# Patient Record
Sex: FEMALE | Race: WHITE | URBAN - METROPOLITAN AREA
[De-identification: names, ages, dates, MRNs, and addresses within clinical notes are randomized per-mention and may not be internally consistent; named-entity substitution may affect disease eponyms.]

---

## 2017-10-04 ENCOUNTER — APPOINTMENT (OUTPATIENT)
Age: 72
Setting detail: DERMATOLOGY
End: 2017-10-06

## 2017-10-04 PROBLEM — C44.91 BASAL CELL CARCINOMA OF SKIN, UNSPECIFIED: Status: ACTIVE | Noted: 2017-10-04

## 2017-10-04 PROCEDURE — OTHER MOHS SURGERY PHONE CONSULTATION: OTHER

## 2017-10-04 NOTE — HPI: MOHS SURGERY CONSULTATION
Additional History: The patient plans to be accompanied by her , Brenton, and will perform her own wound care.

## 2017-10-04 NOTE — PROCEDURE: MOHS SURGERY PHONE CONSULTATION
Patient Reported Location: to the right of her right eye
Has The Patient Ever Been Seen In Our Practice For Mohs Surgery Before?: No
Time Of Mohs Surgery: 0840
Office Location Of Mohs Surgery: [facility]
Referring Provider: Annette Arevalo MD
Detail Level: Detailed
Date Of Mohs Surgery: 10/05/2017
Has The Pathology Report Been Received?: Yes

## 2017-10-05 ENCOUNTER — APPOINTMENT (OUTPATIENT)
Age: 72
Setting detail: DERMATOLOGY
End: 2017-10-06

## 2017-10-05 PROBLEM — C44.91 BASAL CELL CARCINOMA OF SKIN, UNSPECIFIED: Status: ACTIVE | Noted: 2017-10-05

## 2017-10-05 PROBLEM — C44.319 BASAL CELL CARCINOMA OF SKIN OF OTHER PARTS OF FACE: Status: ACTIVE | Noted: 2017-10-05

## 2017-10-05 PROCEDURE — 99201: CPT | Mod: 25

## 2017-10-05 PROCEDURE — OTHER MOHS SURGERY: OTHER

## 2017-10-05 PROCEDURE — OTHER MIPS QUALITY: OTHER

## 2017-10-05 PROCEDURE — 13151 CMPLX RPR E/N/E/L 1.1-2.5 CM: CPT

## 2017-10-05 PROCEDURE — 17311 MOHS 1 STAGE H/N/HF/G: CPT

## 2017-10-05 PROCEDURE — OTHER CONSULTATION FOR MOHS SURGERY: OTHER

## 2017-10-05 PROCEDURE — OTHER PRESCRIPTION: OTHER

## 2017-10-05 RX ORDER — GENTAMICIN SULFATE 0.3 %
OINTMENT (GRAM) OPHTHALMIC (EYE) QD
Qty: 3.5 | Refills: 0 | Status: ERX | COMMUNITY
Start: 2017-10-05

## 2017-10-05 NOTE — PROCEDURE: MOHS SURGERY
Body Location Override (Optional - Billing Will Still Be Based On Selected Body Map Location If Applicable): Right Lateral Canthus

## 2017-10-05 NOTE — PROCEDURE: MOHS SURGERY
Post-Care Instructions: The patient was provided with detailed verbal and written instructions for daily wound care, including use of H2O2 cleansing, followed by application of plain Vaseline and a bandage.  These instructions including Dr. Abraham's contact information should there be any questions or concerns.  The patient is not to engage in any heavy lifting, exercise, or swimming for the next week.  Should the patient develop any fevers, chills, bleeding, or pain not controlled by OTC analgesics, s/he should contact the office immediately.

## 2017-10-05 NOTE — PROCEDURE: CONSULTATION FOR MOHS SURGERY
Location Indication Override (Is Already Calculated Based On Selected Body Location): Area H
Incorporate Mauc In Note: Yes
Name Of The Referring Provider For Procedure: Annette Arevalo MD
Detail Level: Detailed
Size Of Lesion: 0.6
Body Location Override (Optional - Billing Will Still Be Based On Selected Body Map Location If Applicable): Right Lateral Canthus
X Size Of Lesion In Cm (Optional): 0.4

## 2017-10-12 ENCOUNTER — APPOINTMENT (OUTPATIENT)
Age: 72
Setting detail: DERMATOLOGY
End: 2017-11-03

## 2017-10-12 DIAGNOSIS — Z48.02 ENCOUNTER FOR REMOVAL OF SUTURES: ICD-10-CM

## 2017-10-12 DIAGNOSIS — Z85.828 PERSONAL HISTORY OF OTHER MALIGNANT NEOPLASM OF SKIN: ICD-10-CM

## 2017-10-12 PROCEDURE — OTHER SUTURE REMOVAL (GLOBAL PERIOD): OTHER

## 2017-10-12 PROCEDURE — OTHER COUNSELING: OTHER

## 2017-10-12 PROCEDURE — OTHER MIPS QUALITY: OTHER

## 2017-10-12 PROCEDURE — OTHER RETURN TO REFERRING PROVIDER: OTHER

## 2017-10-12 ASSESSMENT — LOCATION ZONE DERM: LOCATION ZONE: FACE

## 2017-10-12 ASSESSMENT — LOCATION SIMPLE DESCRIPTION DERM
LOCATION SIMPLE: RIGHT TEMPLE
LOCATION SIMPLE: RIGHT ZYGOMA

## 2017-10-12 ASSESSMENT — LOCATION DETAILED DESCRIPTION DERM
LOCATION DETAILED: RIGHT MEDIAL TEMPLE
LOCATION DETAILED: RIGHT MEDIAL ZYGOMA

## 2017-10-12 NOTE — PROCEDURE: MIPS QUALITY
Quality 143: Oncology: Medical And Radiation- Pain Intensity Quantified: Pain severity quantified, no pain present
Detail Level: Detailed
Quality 130: Documentation Of Current Medications In The Medical Record: Current Medications Documented
Quality 226: Preventive Care And Screening: Tobacco Use: Screening And Cessation Intervention: Patient screened for tobacco and is an ex-smoker
Quality 431: Preventive Care And Screening: Unhealthy Alcohol Use - Screening: Patient screened for unhealthy alcohol use using a single question and scores less than 2 times per year
Quality 110: Preventive Care And Screening: Influenza Immunization: Influenza Immunization previously received during influenza season

## 2017-10-12 NOTE — PROCEDURE: SUTURE REMOVAL (GLOBAL PERIOD)
Add 41596 Cpt? (Important Note: In 2017 The Use Of 89805 Is Being Tracked By Cms To Determine Future Global Period Reimbursement For Global Periods): no
Body Location Override (Optional - Billing Will Still Be Based On Selected Body Map Location If Applicable): right lateral canthus
Detail Level: Detailed

## 2020-04-03 ENCOUNTER — VIRTUAL VISIT (OUTPATIENT)
Dept: PHYSICAL THERAPY | Facility: CLINIC | Age: 75
End: 2020-04-03
Payer: MEDICARE

## 2020-04-03 DIAGNOSIS — Z96.649 S/P TOTAL HIP ARTHROPLASTY: ICD-10-CM

## 2020-04-03 DIAGNOSIS — S72.002A HIP FRACTURE, LEFT, CLOSED, INITIAL ENCOUNTER (H): Primary | ICD-10-CM

## 2020-04-03 PROCEDURE — 97161 PT EVAL LOW COMPLEX 20 MIN: CPT | Mod: GP | Performed by: PHYSICAL THERAPIST

## 2020-04-03 PROCEDURE — 97110 THERAPEUTIC EXERCISES: CPT | Mod: GP | Performed by: PHYSICAL THERAPIST

## 2020-04-03 PROCEDURE — 97112 NEUROMUSCULAR REEDUCATION: CPT | Mod: GP | Performed by: PHYSICAL THERAPIST

## 2020-04-03 NOTE — LETTER
"DEPARTMENT OF HEALTH AND HUMAN SERVICES  CENTERS FOR MEDICARE & MEDICAID SERVICES    PLAN/UPDATED PLAN OF PROGRESS FOR OUTPATIENT REHABILITATION    PATIENTS NAME:  Ирина Maria   : 1945  PROVIDER NUMBER:    [id number]  HICN:  [id number]  PROVIDER NAME: [facility] PHYSICAL THERAPY  MEDICAL RECORD NUMBER: [id number]   START OF CARE DATE:  SOC Date: 20   TYPE:  PT  PRIMARY/TREATMENT DIAGNOSIS: (Pertinent Medical Diagnosis)  Hip fracture, left, closed, initial encounter (H)  S/P total hip arthroplasty    VISITS FROM START OF CARE:    1    Physical Therapy Virtual Initial Visit  The patient has been notified of following:   \"This virtual visit will be conducted between you and your provider. We have found that certain health care needs can be provided without the need for physical presence.  This service lets us provide the care you need with a virtual visit.\"  Due to external, as well as internal [facility] management of the COVID-19 Virus, Ирина Maria was not seen in our clinic.  As a substitution, we implemented a virtual visit to manage this patient's condition utilizing the [employer] virtual visit platform via the patient s existing code.  The provider, Coral Alves, reviewed the patient's chart, PTRx prescription, and spoke with the patient to determine the following telemedicine visit is appropriate and effective for the patient's care.  The following type of visit was completed:   Video Visit:  The [employer] platform uses a synchronous HIPAA compliant video stream for this patient encounter.       Subjective:  The history is provided by the patient. No  was used.   Patient Health History  Ирина Maria being seen for s/p left hip MIYA revision  after fracture and then dislocation.   Problem began: 3/10/2020.   Problem occurred: 3/10 tripped and fractured femoral neck; MIYA 3/11; dislocation and revision 3/12   Pain is reported as 2/10 on pain " scale.  General health as reported by patient is good.  Pertinent medical history includes: history of fractures.   Red flags:  None as reported by patient.  Medical allergies: none.   Surgeries include:  Orthopedic surgery.    Current medications:  Pain medication.    Current occupation is retired, lives indep with spouse.            Therapist Generated HPI Evaluation  Problem details: Surgeon follow-up 3/30/2020 x-ray showed good healing and hardware placement.  Patient is weightbearing as tolerated.  Instructed to ambulate with walker with gradual weaning to cane.  She is to maintain posterior hip precautions for at least 3 PATIENTS NAME:  Ирина Maria   : 1945    months.  Patient is currently doing approximately 35 minutes of exercises daily from exercises she was given originally in the hospital.  She is using her walker outside for 5 minutes at a time, using a cane indoors, and going up stairs with handrail one at a time.  Review of MD follow-up with reports of good incisional healing, minimal swelling, 0 to 90 degrees of hip flexion range of motion, 5 out of 5 distal lower extremity strength, and no calf tenderness..         Type of problem:  Left hip.  Patient reports pain:  Joint.  Pain is described as aching and is intermittent.  Pain radiates to:  No radiation. Pain is the same all the time.  Since onset symptoms are gradually improving.  Special tests included:  X-ray.  Previous treatment includes surgery. There was significant improvement following previous treatment.  Objective:  System  Physical Exam  General   ROS   Patient observed through video to ambulate with single-point cane with decreased weightbearing through left lower extremity and increased truncal shift.  Observation of balance left lower extremity less than 5 seconds, right lower extremity 15 seconds with increased sway.  Stairs were performed safely but with use of handrail and cane with patient performing 1 step at a  time.  PTRx Content from today's visit:  Exercise Name: Ankle Active Range of Motion Dorsiflexion and Plantarflexion, Notes: WHEN LEG IS ELEVATED  Exercise Name: Toe Raises, Sets: 1 - Reps: 12 - Sessions: 2  Exercise Name: Sitting Hip Adduction Squeeze, Sets: 1 - Reps: 12 - Sessions: 1, Notes: FIRST PUT HANDS OUTSIDE KNEES AND PUSH OUT BUT DO LET KNEES MOVE x10 THEN DO 10 SQUEEZES IN  Exercise Name: Hip AROM Standing Abduction, Sets: 1 - Reps: 12 - Sessions: 2, Notes: DO ON BOTH SIDES  Exercise Name: Hip AROM Standing Extension, Sets: 1 - Reps: 12 - Sessions: 2, Notes: DO ON BOTH SIDES  Exercise Name: Knee Bends, Sets: 1 - Reps: 12 - Sessions: 2, Notes: AT SINK  Exercise Name: Forward Step, Sets: 1 - Reps: 10 - Sessions: 2, Notes: USE A 2-3 INCH HEIGHT BOOK AT BOTTOM STEP TO PRACTICE  Exercise Name: Stepdown Forward, Sets: 1 - Reps: 10 each leg - Sessions: 2  Exercise Name: Balance Single Leg Stance Supported and Unsupported, Sets: 1 - Reps: PRACTICE 1 MINUTE ON EACH SIDE - Sessions: 2, Notes: Daily.  To make this more challenging, you can stand on a pillow, cross your arms, or close your eyes.  ALWAYS HOLD ON TO STEADY SURFACE WITH ONE HAND.  Exercise Name: Tandem Walking and Balance, Sets: 1 - Reps: 1 min each side - Sessions: 2, Notes: PRACTICE JUST STANDING SUPPORTED WITH ONE FOOT IN FRONT- NOT WALKING  ALWAYS HOLD ON TO A STEADY SURFACE WITH ONE HAND    Assessment/Plan:   Patient is a 75 year old female with left side hip complaints.    PATIENTS NAME:  Ирина Maria   : 1945    Patient has the following significant findings with corresponding treatment plan.                Diagnosis 1:  S/p Left hip MIYA  Pain -  self management, education and home program  Decreased ROM/flexibility - manual therapy and therapeutic exercise  Decreased strength - therapeutic exercise  Impaired balance - neuro re-education and gait training  Impaired gait - gait training  Impaired muscle performance - neuro  re-education  Decreased function - home program    Therapy Evaluation Codes:   1) History comprised of:   Personal factors that impact the plan of care:      None.    Comorbidity factors that impact the plan of care are:      None.     Medications impacting care: None.  2) Examination of Body Systems comprised of:   Body structures and functions that impact the plan of care:      Hip.   Activity limitations that impact the plan of care are:      Walking.  3) Clinical presentation characteristics are:   Stable/Uncomplicated.  4) Decision-Making    Low complexity using standardized patient assessment instrument and/or measureable assessment of functional outcome.  Cumulative Therapy Evaluation is: Low complexity.    Previous and current functional limitations:  (See Goal Flow Sheet for this information)    Short term and Long term goals: (See Goal Flow Sheet for this information)     Communication ability:  Patient appears to be able to clearly communicate and understand verbal and written communication and follow directions correctly.  Treatment Explanation - The following has been discussed with the patient:   RX ordered/plan of care  Anticipated outcomes  Possible risks and side effects  This patient would benefit from PT intervention to resume normal activities.   Rehab potential is good.    Frequency:  1 X week, once daily every 2 weeks  Duration:  12 weeks  Discharge Plan:  Achieve all LTG.  Independent in home treatment program.  Reach maximal therapeutic benefit.    Virtual visit contact time  Time of service began: 11:30 AM  Time of service ended: 12:15 PM  Total Time for set up, visit, and documentation: 45 minutes  Payor: MEDICARE / Plan: MEDICARE / Product Type: Medicare /   PATIENTS NAME:  Ирина Maria   : 1945    Procedure Code/s   Therapeutic Exercise (86228): 15 minutes  Neuromuscular Re-education (73962): 15 minutes  Evaluation: 15 minutes    I have reviewed the note as documented above.   "This accurately captures the substance of my conversation with the patient.  Provider location: [place] ([facility]/State)  Patient location: home  ___________________________________________________    Any subjective info about the quality of the visit, ease of use: dna  Patient's opinion about reasonable cost for this visit dna     Caregiver Signature/Credentials _____________________________ Date ________       Treating Provider: Coral Alves, PT   I have reviewed and certified the need for these services and plan of treatment while under my care.        PHYSICIAN'S SIGNATURE:   _________________________________________  Date___________   YAO Vega CNP    Certification period:  Beginning of Cert date period: 04/03/20 to  End of Cert period date: 07/01/20     Functional Level Progress Report: Please see attached \"Goal Flow sheet for Functional level.\"    ____X____ Continue Services or       ________ DC Services                Service dates: From  SOC Date: 04/03/20 date to present                         "

## 2020-04-03 NOTE — PROGRESS NOTES
"Physical Therapy Virtual Initial Visit      The patient has been notified of following:     \"This virtual visit will be conducted between you and your provider. We have found that certain health care needs can be provided without the need for physical presence.  This service lets us provide the care you need with a virtual visit.\"    Due to external, as well as internal [facility] management of the COVID-19 Virus, Ирина Maria was not seen in our clinic.  As a substitution, we implemented a virtual visit to manage this patient's condition utilizing the [employer] virtual visit platform via the patient s existing code.  The provider, Coral Alves, reviewed the patient's chart, PTRx prescription, and spoke with the patient to determine the following telemedicine visit is appropriate and effective for the patient's care.    The following type of visit was completed:   Video Visit:  The [employer] platform uses a synchronous HIPAA compliant video stream for this patient encounter.         Subjective:  The history is provided by the patient. No  was used.   Patient Health History  Ирина Maria being seen for s/p left hip MIYA revision  after fracture and then dislocation.     Problem began: 3/10/2020.   Problem occurred: 3/10 tripped and fractured femoral neck; MIYA 3/11; dislocation and revision 3/12   Pain is reported as 2/10 on pain scale.  General health as reported by patient is good.  Pertinent medical history includes: history of fractures.   Red flags:  None as reported by patient.  Medical allergies: none.   Surgeries include:  Orthopedic surgery.    Current medications:  Pain medication.    Current occupation is retired, lives indep with spouse.                     Therapist Generated HPI Evaluation  Problem details: Surgeon follow-up 3/30/2020 x-ray showed good healing and hardware placement.  Patient is weightbearing as tolerated.  Instructed to ambulate with walker with gradual weaning to " cane.  She is to maintain posterior hip precautions for at least 3 months.    Patient is currently doing approximately 35 minutes of exercises daily from exercises she was given originally in the hospital.  She is using her walker outside for 5 minutes at a time, using a cane indoors, and going up stairs with handrail one at a time.    Review of MD follow-up with reports of good incisional healing, minimal swelling, 0 to 90 degrees of hip flexion range of motion, 5 out of 5 distal lower extremity strength, and no calf tenderness..         Type of problem:  Left hip.          Patient reports pain:  Joint.  Pain is described as aching and is intermittent.  Pain radiates to:  No radiation. Pain is the same all the time.  Since onset symptoms are gradually improving.     Special tests included:  X-ray.  Previous treatment includes surgery. There was significant improvement following previous treatment.                    Objective:    System  Physical Exam  General   ROS     Patient observed through video to ambulate with single-point cane with decreased weightbearing through left lower extremity and increased truncal shift.  Observation of balance left lower extremity less than 5 seconds, right lower extremity 15 seconds with increased sway.  Stairs were performed safely but with use of handrail and cane with patient performing 1 step at a time.    PTRx Content from today's visit:  Exercise Name: Ankle Active Range of Motion Dorsiflexion and Plantarflexion, Notes: WHEN LEG IS ELEVATED  Exercise Name: Toe Raises, Sets: 1 - Reps: 12 - Sessions: 2  Exercise Name: Sitting Hip Adduction Squeeze, Sets: 1 - Reps: 12 - Sessions: 1, Notes: FIRST PUT HANDS OUTSIDE KNEES AND PUSH OUT BUT DO LET KNEES MOVE x10 THEN DO 10 SQUEEZES IN  Exercise Name: Hip AROM Standing Abduction, Sets: 1 - Reps: 12 - Sessions: 2, Notes: DO ON BOTH SIDES  Exercise Name: Hip AROM Standing Extension, Sets: 1 - Reps: 12 - Sessions: 2, Notes: DO ON BOTH  SIDES  Exercise Name: Knee Bends, Sets: 1 - Reps: 12 - Sessions: 2, Notes: AT SINK  Exercise Name: Forward Step, Sets: 1 - Reps: 10 - Sessions: 2, Notes: USE A 2-3 INCH HEIGHT BOOK AT BOTTOM STEP TO PRACTICE  Exercise Name: Stepdown Forward, Sets: 1 - Reps: 10 each leg - Sessions: 2  Exercise Name: Balance Single Leg Stance Supported and Unsupported, Sets: 1 - Reps: PRACTICE 1 MINUTE ON EACH SIDE - Sessions: 2, Notes: Daily.  To make this more challenging, you can stand on a pillow, cross your arms, or close your eyes.  ALWAYS HOLD ON TO STEADY SURFACE WITH ONE HAND.  Exercise Name: Tandem Walking and Balance, Sets: 1 - Reps: 1 min each side - Sessions: 2, Notes: PRACTICE JUST STANDING SUPPORTED WITH ONE FOOT IN FRONT- NOT WALKING  ALWAYS HOLD ON TO A STEADY SURFACE WITH ONE HAND    Assessment/Plan:     Patient is a 75 year old female with left side hip complaints.    Patient has the following significant findings with corresponding treatment plan.                Diagnosis 1:  S/p Left hip MIYA  Pain -  self management, education and home program  Decreased ROM/flexibility - manual therapy and therapeutic exercise  Decreased strength - therapeutic exercise  Impaired balance - neuro re-education and gait training  Impaired gait - gait training  Impaired muscle performance - neuro re-education  Decreased function - home program    Therapy Evaluation Codes:   1) History comprised of:   Personal factors that impact the plan of care:      None.    Comorbidity factors that impact the plan of care are:      None.     Medications impacting care: None.  2) Examination of Body Systems comprised of:   Body structures and functions that impact the plan of care:      Hip.   Activity limitations that impact the plan of care are:      Walking.  3) Clinical presentation characteristics are:   Stable/Uncomplicated.  4) Decision-Making    Low complexity using standardized patient assessment instrument and/or measureable assessment of  functional outcome.  Cumulative Therapy Evaluation is: Low complexity.    Previous and current functional limitations:  (See Goal Flow Sheet for this information)    Short term and Long term goals: (See Goal Flow Sheet for this information)     Communication ability:  Patient appears to be able to clearly communicate and understand verbal and written communication and follow directions correctly.  Treatment Explanation - The following has been discussed with the patient:   RX ordered/plan of care  Anticipated outcomes  Possible risks and side effects  This patient would benefit from PT intervention to resume normal activities.   Rehab potential is good.    Frequency:  1 X week, once daily every 2 weeks  Duration:  12 weeks  Discharge Plan:  Achieve all LTG.  Independent in home treatment program.  Reach maximal therapeutic benefit.    Please refer to the daily flowsheet for treatment today, total treatment time and time spent performing 1:1 timed codes.       Virtual visit contact time    Time of service began: 11:30 AM  Time of service ended: 12:15 PM  Total Time for set up, visit, and documentation: 45 minutes    Payor: MEDICARE / Plan: MEDICARE / Product Type: Medicare /     Procedure Code/s   Therapeutic Exercise (87813): 15 minutes  Neuromuscular Re-education (85717): 15 minutes  Evaluation: 15 minutes    I have reviewed the note as documented above.  This accurately captures the substance of my conversation with the patient.  Provider location: [place] ([facility]/State)  Patient location: home    ___________________________________________________    Any subjective info about the quality of the visit, ease of use: dna  Patient's opinion about reasonable cost for this visit dna

## 2020-04-17 ENCOUNTER — VIRTUAL VISIT (OUTPATIENT)
Dept: PHYSICAL THERAPY | Facility: CLINIC | Age: 75
End: 2020-04-17
Payer: MEDICARE

## 2020-04-17 DIAGNOSIS — S72.002A HIP FRACTURE, LEFT, CLOSED, INITIAL ENCOUNTER (H): Primary | ICD-10-CM

## 2020-04-17 DIAGNOSIS — Z96.649 S/P TOTAL HIP ARTHROPLASTY: ICD-10-CM

## 2020-04-17 PROCEDURE — 97530 THERAPEUTIC ACTIVITIES: CPT | Mod: 95 | Performed by: PHYSICAL THERAPIST

## 2020-04-17 PROCEDURE — 97110 THERAPEUTIC EXERCISES: CPT | Mod: 95 | Performed by: PHYSICAL THERAPIST

## 2020-04-17 NOTE — PROGRESS NOTES
"Physical Therapy Virtual Follow Up Visit      The patient has been notified of following:     \"This virtual visit will be conducted between you and your provider. We have found that certain health care needs can be provided without the need for physical presence.  This service lets us provide the care you need with a virtual visit.\"    Due to external, as well as internal [facility] management of the COVID-19 Virus, Ирина Maria was not seen in our clinic.  As a substitution, we implemented a virtual visit to manage this patient's condition utilizing the P[employer]x virtual visit platform via the patient s existing code.  The provider, Coral Alves, reviewed the patient's chart, PTRx prescription, and spoke with the patient to determine the following telemedicine visit is appropriate and effective for the patient's care.    The following type of visit was completed:   Video Visit:  The PTRx platform uses a synchronous HIPAA compliant video stream for this patient encounter.        S: Ирина Maria is a 75 year old female. Connected virtually on the PTRx platform to discuss their condition/progress. They noted improvements in doing stairs without assistance; 1/2 mile walking with cane with some soreness last night; cutting back on tyelonol..  They noted ongoing pain or limitations with increase in anterior hip pain that improves with rest..     Current pain level: 2/10      O: Patient demonstrated balance:SLS 15-20 sec; reciprocal stairs   System  Physical Exam  General   ROS  PTRx Content from today's visit: Addressed questions: soreness w walkling  Exercise Name: All 4s Arm Lift, Sets: 1 - Reps: 12 each - Sessions: 1  Exercise Name: All 4s Leg Lift, Sets: 1 - Reps: 12 - Sessions: 1  Exercise Name: birddog, Sets: 1 - Reps: 12 - Sessions: 1  Exercise Name: Side Lunge, Sets: 1 - Reps: 12 - Sessions: EACH DIRECTION, Notes: SLIDE FOOT INSTEAD OF STEPPING OUT.  SIDE/FRONT/BACK.  12 TIMES EACH DIRECTION, BOTH " SIDES  Exercise Name: Functional Lunge Backward, Sets: 1 - Reps: 12  each side - Sessions: 1, Notes: hold onto chair    A:   Patient is progressing as expected.  Response to therapy has shown an improvement in  function      P: Patient will continue with the exercise program assigned on their PTRx code and will add the following measures to manage their pain/condition: see PTRx and f/u in 3 weeks     Current treatment program is being advanced to more complex exercises.      Virtual visit contact time    Time of service began: 11:25 AM  Time of service ended: 12:00 PM  Total Time for set up, visit, and documentation: 40 minutes    Payor: MEDICARE / Plan: MEDICARE / Product Type: Medicare /   .  Procedure Code/s   Therapeutic Exercise (36629): 15 minutes  Therapeutic Activities (96933): 18 minutes    I have reviewed the note as documented above.  This accurately captures the substance of my conversation with the patient.  Provider location: [place], [place] ([facility]/State)  Patient location: home

## 2020-05-08 ENCOUNTER — VIRTUAL VISIT (OUTPATIENT)
Dept: PHYSICAL THERAPY | Facility: CLINIC | Age: 75
End: 2020-05-08
Payer: MEDICARE

## 2020-05-08 DIAGNOSIS — Z96.649 S/P TOTAL HIP ARTHROPLASTY: ICD-10-CM

## 2020-05-08 DIAGNOSIS — S72.002A HIP FRACTURE, LEFT, CLOSED, INITIAL ENCOUNTER (H): Primary | ICD-10-CM

## 2020-05-08 PROCEDURE — 97530 THERAPEUTIC ACTIVITIES: CPT | Mod: 95 | Performed by: PHYSICAL THERAPIST

## 2020-05-08 PROCEDURE — 97110 THERAPEUTIC EXERCISES: CPT | Mod: 95 | Performed by: PHYSICAL THERAPIST

## 2020-05-08 NOTE — PROGRESS NOTES
"Physical Therapy Virtual Follow Up Visit      The patient has been notified of following:     \"This virtual visit will be conducted between you and your provider. We have found that certain health care needs can be provided without the need for physical presence.  This service lets us provide the care you need with a virtual visit.\"    Due to external, as well as internal [facility] management of the COVID-19 Virus, Ирина Maria was not seen in our clinic.  As a substitution, we implemented a virtual visit to manage this patient's condition utilizing the PTRx virtual visit platform via the patient s existing code.  The provider, Coral Alves, reviewed the patient's chart, PTRx prescription, and spoke with the patient to determine the following telemedicine visit is appropriate and effective for the patient's care.    The following type of visit was completed:   Video Visit:  The PTRx platform uses a synchronous HIPAA compliant video stream for this patient encounter.        S: Ирина Maria is a 75 year old female. Connected virtually on the PTRx platform to discuss their condition/progress. They noted improvements in surgical leg balance, walking outside slow pace without right ear level and unlevel surfaces., and pain..  They noted ongoing pain or limitations with difficulty doing lunge exercise due to right glut pain and left shoulder pain with all fours exercises..     Current pain level: 1/10      O: Patient demonstrated 1 MIN SINGLE LEG BALANCE AND INDEP AMBULATION     PTRx Content from today's visit:  Exercise Name: Isometric Shoulder Abduction , Sets: 1 - Reps: 8 - Sessions: 2, Notes: 5 sec hold daily  Exercise Name: Isometric Shoulder External Rotation, Sets: 1 - Reps: 8 - Sessions: 2, Notes: 5 sec hold daily  Exercise Name: Shoulder Isometric Internal Rotation, Sets: 1 - Reps: 8 - Sessions: 2, Notes: 5 sec hold daily  Exercise Name: Isometric Shoulder Extension, Sets: 1 - Reps: 8 - Sessions: " 2, Notes: 5 sec hold daily  Exercise Name: Side Lunge, Sets: 1 - Reps: 12 - Sessions: EACH DIRECTION, Notes: SLIDE FOOT INSTEAD OF STEPPING OUT.  SIDE/FRONT/BACK.  12 TIMES EACH DIRECTION, BOTH SIDES  Exercise Name: Functional Lunge Backward, Sets: 1 - Reps: 12  each side - Sessions: 1, Notes: hold onto chair;SHORTEN MOVEMENT-DO NOT DO FULL LUNGE.    A:   Patient's symptoms are resolving.  Response to therapy has shown an improvement in  gait      P: Patient will continue with the exercise program assigned on their PTRx code and will add the following measures to manage their pain/condition: see PTRx; requesting in person visit when Covid allows     Current treatment program is being advanced to more complex exercises.      Virtual visit contact time    Time of service began: 11:20 AM  Time of service ended: 12:00 PM  Total Time for set up, visit, and documentation: 45 minutes    Payor: MEDICARE / Plan: MEDICARE / Product Type: Medicare /   .  Procedure Code/s   Therapeutic Exercise (87576): 25 minutes  Therapeutic Activities (77512): 15 minutes    I have reviewed the note as documented above.  This accurately captures the substance of my conversation with the patient.  Provider location: [place], [place] ([facility]/State)  Patient location: home

## 2020-07-03 ENCOUNTER — THERAPY VISIT (OUTPATIENT)
Dept: PHYSICAL THERAPY | Facility: CLINIC | Age: 75
End: 2020-07-03
Payer: MEDICARE

## 2020-07-03 DIAGNOSIS — Z96.649 S/P TOTAL HIP ARTHROPLASTY: ICD-10-CM

## 2020-07-03 DIAGNOSIS — S72.002A HIP FRACTURE, LEFT, CLOSED, INITIAL ENCOUNTER (H): Primary | ICD-10-CM

## 2020-07-03 PROCEDURE — 97530 THERAPEUTIC ACTIVITIES: CPT | Mod: GP | Performed by: PHYSICAL THERAPIST

## 2020-07-03 PROCEDURE — 97110 THERAPEUTIC EXERCISES: CPT | Mod: GP | Performed by: PHYSICAL THERAPIST

## 2020-07-03 NOTE — LETTER
DEPARTMENT OF HEALTH AND HUMAN SERVICES  CENTERS FOR MEDICARE & MEDICAID SERVICES    PLAN/UPDATED PLAN OF PROGRESS FOR OUTPATIENT REHABILITATION    PATIENTS NAME:  Ирина Maria     : 1945    PROVIDER NUMBER:    [id number]    HICN:   [id number]     PROVIDER NAME: [facility]    MEDICAL RECORD NUMBER: [id number]     START OF CARE DATE:  SOC Date: 20   TYPE:  PT    PRIMARY/TREATMENT DIAGNOSIS: (Pertinent Medical Diagnosis)     Hip fracture, left, closed, initial encounter (H)  S/P total hip arthroplasty    VISITS FROM START OF CARE:        DISCHARGE REPORT    Progress reporting period is from 4/3/2020 to 7/3/2020.       SUBJECTIVE  Subjective changes noted by patient:  yes.  Subjective: Doing well overall    Current pain level is 0/10  .     Previous pain level was  3/10  .   Changes in function:  Yes (See Goal flowsheet attached for changes in current functional level)  Adverse reaction to treatment or activity: None    OBJECTIVE  Changes noted in objective findings:  Yes  Objective: Indep ambulation community distances and functional transfers without pain.    ASSESSMENT/PLAN  Updated problem list and treatment plan: Diagnosis 1:  S/p TKA    STG/LTGs have been met or progress has been made towards goals:  Yes (See Goal flow sheet completed today.)  Assessment of Progress: The patient has met all of their long term goals.  Self Management Plans:  Patient is independent in a home treatment program.  I have re-evaluated this patient and find that the nature, scope, duration and intensity of the therapy is appropriate for the medical condition of the patient.  Ирина continues to require the following intervention to meet STG and LTG's:  PT intervention is no longer required to meet STG/LTG.        Ирина Maria         Recommendations:  This patient is ready to be discharged from therapy and continue their home treatment program.    Please refer to the daily flowsheet for  "treatment today, total treatment time and time spent performing 1:1 timed codes.          Caregiver Signature/Credentials _____________________________ Date ________       Treating Provider: Coral Alves, PT   I have reviewed and certified the need for these services and plan of treatment while under my care.        PHYSICIAN'S SIGNATURE:   _____________________________________ Date___________     Katelyn Cruz NP    Certification period:  Beginning of Cert date period: 07/02/20 to  End of Cert period date: 08/02/20     Functional Level Progress Report: Please see attached \"Goal Flow sheet for Functional level.\"    ____X____ Continue Services or       ________ DC Services                Service dates: From  SOC Date: 04/03/20 date to present                         "

## 2020-07-03 NOTE — LETTER
DEPARTMENT OF HEALTH AND HUMAN SERVICES  CENTERS FOR MEDICARE & MEDICAID SERVICES    PLAN/UPDATED PLAN OF PROGRESS FOR OUTPATIENT REHABILITATION    PATIENTS NAME:  CrowИрина     : 1945    PROVIDER NUMBER:    [id number]    HICN:  [id number]     PROVIDER NAME: [facility]    MEDICAL RECORD NUMBER: [id number]     START OF CARE DATE:  SOC Date: 20   TYPE:  PT    PRIMARY/TREATMENT DIAGNOSIS: (Pertinent Medical Diagnosis)     Hip fracture, left, closed, initial encounter (H)  S/P total hip arthroplasty    VISITS FROM START OF CARE:  Rxs Used: 4     DISCHARGE REPORT    Progress reporting period is from 4/3/2020 to 7/3/2020.       SUBJECTIVE  Subjective changes noted by patient:  yes.  Subjective: Doing well overall    Current pain level is 0/10  .     Previous pain level was  3/10  .   Changes in function:  Yes (See Goal flowsheet attached for changes in current functional level)  Adverse reaction to treatment or activity: None    OBJECTIVE  Changes noted in objective findings:  Yes  Objective: Indep ambulation community distances and functional transfers without pain.    ASSESSMENT/PLAN  Updated problem list and treatment plan: Diagnosis 1:  S/p TKA    STG/LTGs have been met or progress has been made towards goals:  Yes (See Goal flow sheet completed today.)  Assessment of Progress: The patient has met all of their long term goals.  Self Management Plans:  Patient is independent in a home treatment program.  I have re-evaluated this patient and find that the nature, scope, duration and intensity of the therapy is appropriate for the medical condition of the patient.  Ирина continues to require the following intervention to meet STG and LTG's:  PT intervention is no longer required to meet STG/LTG.        Ирина Maria             Recommendations:  This patient is ready to be discharged from therapy and continue their home treatment program.    Please refer to the daily  "flowsheet for treatment today, total treatment time and time spent performing 1:1 timed codes.          Caregiver Signature/Credentials _____________________________ Date ________       Treating Provider: Coral Alves PT   I have reviewed and certified the need for these services and plan of treatment while under my care.        PHYSICIAN'S SIGNATURE:   ____________________________________  Date___________     Katelyn Cruz NP    Certification period:  Beginning of Cert date period: 07/02/20 to  End of Cert period date: 08/02/20     Functional Level Progress Report: Please see attached \"Goal Flow sheet for Functional level.\"    ____X____ Continue Services or       ________ DC Services                Service dates: From  SOC Date: 04/03/20 date to present                         "

## 2020-07-07 NOTE — PROGRESS NOTES
DISCHARGE REPORT    Progress reporting period is from 4/3/2020 to 7/3/2020.       SUBJECTIVE  Subjective changes noted by patient:  yes.  Subjective: Doing well overall    Current pain level is 0/10  .     Previous pain level was  3/10  .   Changes in function:  Yes (See Goal flowsheet attached for changes in current functional level)  Adverse reaction to treatment or activity: None    OBJECTIVE  Changes noted in objective findings:  Yes  Objective: Indep ambulation community distances and functional transfers without pain.    ASSESSMENT/PLAN  Updated problem list and treatment plan: Diagnosis 1:  S/p TKA    STG/LTGs have been met or progress has been made towards goals:  Yes (See Goal flow sheet completed today.)  Assessment of Progress: The patient has met all of their long term goals.  Self Management Plans:  Patient is independent in a home treatment program.  I have re-evaluated this patient and find that the nature, scope, duration and intensity of the therapy is appropriate for the medical condition of the patient.  Ирина continues to require the following intervention to meet STG and LTG's:  PT intervention is no longer required to meet STG/LTG.    Recommendations:  This patient is ready to be discharged from therapy and continue their home treatment program.    Please refer to the daily flowsheet for treatment today, total treatment time and time spent performing 1:1 timed codes.

## 2020-11-02 ENCOUNTER — THERAPY VISIT (OUTPATIENT)
Dept: PHYSICAL THERAPY | Facility: CLINIC | Age: 75
End: 2020-11-02
Payer: MEDICARE

## 2020-11-02 DIAGNOSIS — M25.512 SHOULDER PAIN, LEFT: Primary | ICD-10-CM

## 2020-11-02 PROCEDURE — 98968 PH1 ASSMT&MGMT NQHP 21-30: CPT | Mod: GP | Performed by: PHYSICAL THERAPIST

## 2020-11-02 NOTE — LETTER
DEPARTMENT OF HEALTH AND HUMAN SERVICES  CENTERS FOR MEDICARE & MEDICAID SERVICES    PLAN/UPDATED PLAN OF PROGRESS FOR OUTPATIENT REHABILITATION  The virtual visit will provide the care the patient needs. We reviewed the patient's chart, and PTRx prescription to determine the following telemedicine visit is appropriate and effective for the patient's care.  Telephone Visit    PATIENTS NAME:  Ирина Maria   : 1945  PROVIDER NUMBER:    [id number]  HICN:  [id number]   [facility]  MEDICAL RECORD NUMBER: [id number]   START OF CARE DATE:  SOC Date: 20   TYPE:  PT  PRIMARY/TREATMENT DIAGNOSIS: (Pertinent Medical Diagnosis)  Shoulder pain, left    VISITS FROM START OF CARE:  Rxs Used: 1     Patient seen for one time evaluation and treatment.  Patient did not return for further treatment and current status is unknown.  Please see initial evaluation for further information.    [place] [facility] Initial Evaluation  Therapist Generated HPI Evaluation  Problem details: Onset: MD order 2020  CC: Left posterior shoulder blade pain, mild tingling after swimming.  Noted increase in pain recently after going for a walk- would need to support arm on pants when walking.  Aggravating: arm in dependent position when walking or when computer mousing; lying on left side  Non-aggravating: reaching  SMHx: Left neck pain and movement limitation; fall 20 yrs ago with elbow fracture; s/p MIYA 3/2020.  No SOB, No cardiac history.  Telephone visit due to patient difficulty with internet connection..         Type of problem:  Left shoulder.  This is a chronic condition.  Condition occurred with:  Unknown cause.  Where condition occurred: other.  Patient reports pain:  Posterior.  Pain is described as other and is intermittent.  Pain radiates to:  Cervical and shoulder. Pain is worse during the day.  Since onset symptoms are unchanged.  Associated symptoms:  Tingling.  Symptoms are exacerbated by lying on extremity and certain positions  and relieved by ice and bracing/immobilizing.  Special tests included:  MRI.  Work activity restrictions: retired.  Barriers include:  None as reported by patient.                        Objective:  System  Shoulder Evaluation:  ROM:  AROM:  normal  Strength:  normal  Stability Testing:  normal  Special Tests:  normal  Palpation:  normal  Mary Kate Cervical Evaluation  Movement Loss:  Rotation Left (ROT L): mod and pain  Test Movements:  RET:   Repeat RET: During: no effect  After: no effect  Mechanical Response: IncROM    ASSESSMENT/PLAN:  Patient is a 75 year old female with left shoulder complaints.    Patient has the following significant findings with corresponding treatment plan.                Diagnosis 1:  Left shoulder pain  Pain -  manual therapy, self management, education, directional preference exercise and home program  Decreased ROM/flexibility - manual therapy and therapeutic exercise  Decreased joint mobility - manual therapy and therapeutic exercise  Decreased strength - therapeutic exercise and therapeutic activities  Decreased proprioception - neuro re-education and therapeutic activities  Impaired gait - gait training  Impaired muscle performance - neuro re-education  Decreased function - therapeutic activities  Impaired posture - neuro re-education    Previous and current functional limitations:  (See Goal Flow Sheet for this information)    Short term and Long term goals: (See Goal Flow Sheet for this information)     Communication ability:  Patient appears to be able to clearly communicate and understand verbal and written communication and follow directions correctly.  Treatment Explanation - The following has been discussed with the patient:   RX ordered/plan of care  Anticipated outcomes  Possible risks and side effects  This patient would benefit from PT intervention to resume normal activities.   Rehab potential is  "good.    Frequency:  1X week, once daily  Duration:  for 6 weeks  Discharge Plan:  Achieve all LTG.  Independent in home treatment program.  Reach maximal therapeutic benefit.                  Treating Provider: Coral Alves, PT       I have reviewed and certified the need for these services and plan of treatment while under my care.        PHYSICIAN'S SIGNATURE:   ____________________________Date___________                            Scooby Brown MD    Certification period:  Beginning of Cert date period: 11/02/20 to  End of Cert period date: 01/30/21     Functional Level Progress Report: Please see attached \"Goal Flow sheet for Functional level.\"    ____X____ Continue Services or       ________ DC Services                Service dates: From  SOC Date: 11/02/20 date to present                         "

## 2020-11-02 NOTE — PROGRESS NOTES
Patient seen for one time evaluation and treatment.  Patient did not return for further treatment and current status is unknown.  Please see initial evaluation for further information.        [facility] Initial Evaluation  Subjective:  The history is provided by the patient. No  was used.   Therapist Generated HPI Evaluation  Problem details: Onset: MD order 8/24/2020  CC: Left posterior shoulder blade pain, mild tingling after swimming.  Noted increase in pain recently after going for a walk- would need to support arm on pants when walking.  Aggravating: arm in dependent position when walking or when computer mousing; lying on left side  Non-aggravating: reaching  SMHx: Left neck pain and movement limitation; fall 20 yrs ago with elbow fracture; s/p MIYA 3/2020.  No SOB, No cardiac history.    Telephone visit due to patient difficulty with internet connection..         Type of problem:  Left shoulder.    This is a chronic condition.  Condition occurred with:  Unknown cause.  Where condition occurred: other.  Patient reports pain:  Posterior.  Pain is described as other and is intermittent.  Pain radiates to:  Cervical and shoulder. Pain is worse during the day.  Since onset symptoms are unchanged.  Associated symptoms:  Tingling. Symptoms are exacerbated by lying on extremity and certain positions  and relieved by ice and bracing/immobilizing.  Special tests included:  MRI.    Work activity restrictions: retired.  Barriers include:  None as reported by patient.                        Objective:  System                   Shoulder Evaluation:  ROM:  AROM:  normal                                  Strength:  normal                      Stability Testing:  normal      Special Tests:  normal      Palpation:  normal                                         Mary Kate Cervical Evaluation      Movement Loss:                Rotation Left (ROT L): mod and pain  Test Movements:      RET:    Repeat RET: During: no effect  After: no effect  Mechanical Response: IncROM                                                                     ROS    ASSESSMENT/PLAN:    Patient is a 75 year old female with left shoulder complaints.    Patient has the following significant findings with corresponding treatment plan.                Diagnosis 1:  Left shoulder pain  Pain -  manual therapy, self management, education, directional preference exercise and home program  Decreased ROM/flexibility - manual therapy and therapeutic exercise  Decreased joint mobility - manual therapy and therapeutic exercise  Decreased strength - therapeutic exercise and therapeutic activities  Decreased proprioception - neuro re-education and therapeutic activities  Impaired gait - gait training  Impaired muscle performance - neuro re-education  Decreased function - therapeutic activities  Impaired posture - neuro re-education    Previous and current functional limitations:  (See Goal Flow Sheet for this information)    Short term and Long term goals: (See Goal Flow Sheet for this information)     Communication ability:  Patient appears to be able to clearly communicate and understand verbal and written communication and follow directions correctly.  Treatment Explanation - The following has been discussed with the patient:   RX ordered/plan of care  Anticipated outcomes  Possible risks and side effects  This patient would benefit from PT intervention to resume normal activities.   Rehab potential is good.    Frequency:  1X week, once daily  Duration:  for 6 weeks  Discharge Plan:  Achieve all LTG.  Independent in home treatment program.  Reach maximal therapeutic benefit.    Please refer to the daily flowsheet for treatment today, total treatment time and time spent performing 1:1 timed codes.

## 2020-12-23 ENCOUNTER — THERAPY VISIT (OUTPATIENT)
Dept: PHYSICAL THERAPY | Facility: CLINIC | Age: 75
End: 2020-12-23
Payer: MEDICARE

## 2020-12-23 DIAGNOSIS — M25.512 SHOULDER PAIN, LEFT: Primary | ICD-10-CM

## 2020-12-23 PROCEDURE — 97110 THERAPEUTIC EXERCISES: CPT | Mod: 95 | Performed by: PHYSICAL THERAPIST

## 2022-03-09 NOTE — PROCEDURE: MIPS QUALITY
Is this the first time this has happened to this severity? Bilateral or unilateral? Any visible redness or swelling?     She has been at this current dose for 3 months now, so it is difficult to say if it is related. We can certainly try decreasing back to 50mg BID for her spironolactone to see if this helps. If severe, may also benefit from a short break and retry to see if same happens next month.     If pain is prolonged, worsening, associated with noticeable redness and swelling, or she develops fevers, chills, she should go to urgent care.   
Quality 431: Preventive Care And Screening: Unhealthy Alcohol Use - Screening: Patient screened for unhealthy alcohol use using a single question and scores less than 2 times per year
Quality 226: Preventive Care And Screening: Tobacco Use: Screening And Cessation Intervention: Patient screened for tobacco and is an ex-smoker
Quality 130: Documentation Of Current Medications In The Medical Record: Current Medications Documented
Detail Level: Detailed
Quality 143: Oncology: Medical And Radiation- Pain Intensity Quantified: Pain severity quantified, no pain present
Quality 110: Preventive Care And Screening: Influenza Immunization: Influenza Immunization previously received during influenza season
